# Patient Record
Sex: FEMALE | Race: WHITE | Employment: OTHER | URBAN - METROPOLITAN AREA
[De-identification: names, ages, dates, MRNs, and addresses within clinical notes are randomized per-mention and may not be internally consistent; named-entity substitution may affect disease eponyms.]

---

## 2017-09-10 ENCOUNTER — HOSPITAL ENCOUNTER (EMERGENCY)
Age: 82
Discharge: HOME | End: 2017-09-10
Attending: EMERGENCY MEDICINE | Admitting: EMERGENCY MEDICINE
Payer: MEDICARE

## 2017-09-10 ENCOUNTER — APPOINTMENT (OUTPATIENT)
Dept: CT IMAGING | Age: 82
End: 2017-09-10
Attending: PHYSICIAN ASSISTANT
Payer: MEDICARE

## 2017-09-10 ENCOUNTER — APPOINTMENT (OUTPATIENT)
Dept: CT IMAGING | Age: 82
End: 2017-09-10
Attending: EMERGENCY MEDICINE
Payer: MEDICARE

## 2017-09-10 ENCOUNTER — APPOINTMENT (OUTPATIENT)
Dept: GENERAL RADIOLOGY | Age: 82
End: 2017-09-10
Attending: EMERGENCY MEDICINE
Payer: MEDICARE

## 2017-09-10 VITALS
HEIGHT: 62 IN | BODY MASS INDEX: 23 KG/M2 | DIASTOLIC BLOOD PRESSURE: 62 MMHG | TEMPERATURE: 98.4 F | WEIGHT: 125 LBS | HEART RATE: 86 BPM | RESPIRATION RATE: 16 BRPM | SYSTOLIC BLOOD PRESSURE: 149 MMHG | OXYGEN SATURATION: 97 %
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
## 2017-09-10 DIAGNOSIS — S05.92XA: ICD-10-CM

## 2017-09-10 PROCEDURE — 90715 TDAP VACCINE 7 YRS/> IM: CPT | Performed by: PHYSICIAN ASSISTANT

## 2017-09-10 PROCEDURE — 99283 EMERGENCY DEPT VISIT LOW MDM: CPT

## 2017-09-10 PROCEDURE — 74011250636 HC RX REV CODE- 250/636: Performed by: PHYSICIAN ASSISTANT

## 2017-09-10 PROCEDURE — 70480 CT ORBIT/EAR/FOSSA W/O DYE: CPT

## 2017-09-10 PROCEDURE — 74011250637 HC RX REV CODE- 250/637: Performed by: PHYSICIAN ASSISTANT

## 2017-09-10 PROCEDURE — 90471 IMMUNIZATION ADMIN: CPT

## 2017-09-10 PROCEDURE — 70450 CT HEAD/BRAIN W/O DYE: CPT

## 2017-09-10 RX ORDER — ACETAMINOPHEN 325 MG/1
650 TABLET ORAL
Status: COMPLETED | OUTPATIENT
Start: 2017-09-10 | End: 2017-09-10

## 2017-09-10 RX ADMIN — ACETAMINOPHEN 650 MG: 325 TABLET, FILM COATED ORAL at 20:21

## 2017-09-10 RX ADMIN — TETANUS TOXOID, REDUCED DIPHTHERIA TOXOID AND ACELLULAR PERTUSSIS VACCINE, ADSORBED 0.5 ML: 5; 2.5; 8; 8; 2.5 SUSPENSION INTRAMUSCULAR at 20:22

## 2017-09-10 NOTE — ED PROVIDER NOTES
HPI Comments: 81 yo female with hx of thyroid dx, anemia, HTN, CRI, colon CA and anxiety here for evaluation after fall. States she was walking when her shoe skidded on the concrete causing her to fall striking left side of face. Staes pain around left obit; denies visual changes. No N/V. Denies HA, neck pain, CP, SOB, abd pain, flank pain, urinary symptoms. Denies having dizziness, CP, SOB prior to, during or since fall. No changes in mentation per family. +Smoker. Patient is a 80 y.o. female presenting with fall. The history is provided by the patient. Fall   Incident onset: this evening. The fall occurred while walking. She fell from a height of ground level. Point of impact: Left side of face. The pain is at a severity of 7/10. The pain is mild. She was ambulatory at the scene. Pertinent negatives include no visual change, no numbness, no abdominal pain, no nausea, no vomiting, no hematuria, no extremity weakness and no loss of consciousness. It is unknown when the patient last had a tetanus shot.         Past Medical History:   Diagnosis Date    Anemia     CAD (coronary artery disease)     Cancer ([facility] [address] 3/2012    colon    CRI (chronic renal insufficiency)     benign atrophic left kidney-renal stones    Gastrointestinal disorder     resection, colonoscopy    Hypercholesteremia     Hypertension     Hypothyroid     Other ill-defined conditions     chemotherapy via richy cath for rectal ca    Other ill-defined conditions     radiation therapy for rectal cancer-last treatment May 2012    Psychiatric disorder     anxiety    Thyroid disease        Past Surgical History:   Procedure Laterality Date    ABDOMEN SURGERY PROC UNLISTED  7/2012    low anterior resection with protective loop ileostomy    ABDOMEN SURGERY PROC UNLISTED  2/2013    fistulotomy transanal closure of posterior rectal fistula    HX GI  11/18/13    Kraske approach with coccygectomy and closure of rectal fistula    HX HEENT  2/21/14    lupe myringotomy    HX HYSTERECTOMY  age 40    vaginal    HX OTHER SURGICAL      stent in artery to kidney    HX OTHER SURGICAL      colonoscopy    HX OTHER SURGICAL      Colon resection with colostomy    HX OTHER SURGICAL  2/26/14    wound debridement    HX VASCULAR ACCESS      richy cath left upper chest-non functioning now         Family History:   Problem Relation Age of Onset    Heart Disease Sister     Heart Disease Brother      2 brothers    Breast Cancer Sister        Social History     Social History    Marital status:      Spouse name: N/A    Number of children: N/A    Years of education: N/A     Occupational History    Not on file. Social History Main Topics    Smoking status: Current Every Day Smoker     Packs/day: 0.25     Years: 60.00    Smokeless tobacco: Never Used      Comment: cigarettes    Alcohol use No      Comment: none since colon surgery- seldom prior to that    Drug use: No    Sexual activity: Not on file     Other Topics Concern    Not on file     Social History Narrative         ALLERGIES: Review of patient's allergies indicates no known allergies. Review of Systems   Constitutional: Negative. HENT: Positive for facial swelling. Negative for ear discharge. Eyes: Negative for photophobia, discharge and visual disturbance. Respiratory: Negative for apnea, cough, chest tightness and shortness of breath. Cardiovascular: Negative for chest pain, palpitations and leg swelling. Gastrointestinal: Negative for abdominal distention, abdominal pain, blood in stool, nausea and vomiting. Genitourinary: Negative for difficulty urinating, dysuria, flank pain, frequency and hematuria. Musculoskeletal: Negative for back pain, extremity weakness, gait problem, joint swelling and neck pain. Skin: Negative for color change and pallor. Neurological: Negative for dizziness, loss of consciousness, syncope, weakness and numbness. Psychiatric/Behavioral: Negative for behavioral problems and confusion. The patient is not nervous/anxious. Vitals:    09/10/17 1748   BP: 127/55   Pulse: 95   Resp: 16   Temp: 97.5 °F (36.4 °C)   SpO2: 99%   Weight: 56.7 kg (125 lb)   Height: 5' 2\" (1.575 m)            Physical Exam   Constitutional: She is oriented to person, place, and time. She appears well-developed and well-nourished. No distress. HENT:   Head: Normocephalic. Right Ear: External ear normal.   Left Ear: External ear normal.   Nose: Nose normal.   Mouth/Throat: Oropharynx is clear and moist.   Ecchymosis and tenderness around left orbit. No hemorrhage noted. Eyes: Conjunctivae and EOM are normal. Pupils are equal, round, and reactive to light. Right eye exhibits no discharge. Left eye exhibits no discharge. Neck: Normal range of motion. Neck supple. Cardiovascular: Normal rate, regular rhythm, normal heart sounds and intact distal pulses. Pulmonary/Chest: Effort normal and breath sounds normal.   Abdominal: Soft. Bowel sounds are normal. She exhibits no distension. There is no tenderness. There is no rebound and no guarding. Musculoskeletal: Normal range of motion. She exhibits no edema or tenderness. Cervical back: Normal.   Neurological: She is alert and oriented to person, place, and time. She has normal strength. She is not disoriented. No cranial nerve deficit or sensory deficit. Coordination normal.   Skin: Skin is warm and dry. No rash noted. Psychiatric: She has a normal mood and affect. Her behavior is normal. Judgment and thought content normal.   Nursing note and vitals reviewed.        MDM  Number of Diagnoses or Management Options  Fall, initial encounter:   Orbit injury, left:      Amount and/or Complexity of Data Reviewed  Tests in the radiology section of CPT®: ordered and reviewed  Discuss the patient with other providers: yes  Independent visualization of images, tracings, or specimens: yes      ED Course       Procedures    Patient has been reassessed. Talkative with family in room; no changes in mentation. Reviewed medications and radiographics with patient and family. Ready to discharge home. Discussed case with attending Physician. Agrees with care and will D/C with follow up.      8:44 PM  Patient's results have been reviewed with them. Patient and/or family have verbally conveyed their understanding and agreement of the patient's signs, symptoms, diagnosis, treatment and prognosis and additionally agree to follow up as recommended or return to the Emergency Room should their condition change prior to follow-up. Discharge instructions have also been provided to the patient with some educational information regarding their diagnosis as well a list of reasons why they would want to return to the ER prior to their follow-up appointment should their condition change.   ALVIN Currie

## 2017-09-10 NOTE — ED TRIAGE NOTES
Pt [place] ans [place] on [place]. Pt fell to groind hitting her head above her left eye and injuring her left knee. Pt was assisted up and [facility] to [facility].  Pt sent here for further eval.  Pt denies having LOC.

## 2017-09-11 NOTE — DISCHARGE INSTRUCTIONS
Preventing Falls: Care Instructions  Your Care Instructions  Getting around your home safely can be a challenge if you have injuries or health problems that make it easy for you to fall. Loose rugs and furniture in walkways are among the dangers for many older people who have problems walking or who have poor eyesight. People who have conditions such as arthritis, osteoporosis, or dementia also have to be careful not to fall. You can make your home safer with a few simple measures. Follow-up care is a key part of your treatment and safety. Be sure to make and go to all appointments, and call your doctor if you are having problems. It's also a good idea to know your test results and keep a list of the medicines you take. How can you care for yourself at home? Taking care of yourself  · You may get dizzy if you do not drink enough water. To prevent dehydration, drink plenty of fluids, enough so that your urine is light yellow or clear like water. Choose water and other caffeine-free clear liquids. If you have kidney, heart, or liver disease and have to limit fluids, talk with your doctor before you increase the amount of fluids you drink. · Exercise regularly to improve your strength, muscle tone, and balance. Walk if you can. Swimming may be a good choice if you cannot walk easily. · Have your vision and hearing checked each year or any time you notice a change. If you have trouble seeing and hearing, you might not be able to avoid objects and could lose your balance. · Know the side effects of the medicines you take. Ask your doctor or pharmacist whether the medicines you take can affect your balance. Sleeping pills or sedatives can affect your balance. · Limit the amount of alcohol you drink. Alcohol can impair your balance and other senses. · Ask your doctor whether calluses or corns on your feet need to be removed.  If you wear loose-fitting shoes because of calluses or corns, you can lose your balance and fall. · Talk to your doctor if you have numbness in your feet. Preventing falls at home  · Remove raised doorway thresholds, throw rugs, and clutter. Repair loose carpet or raised areas in the floor. · Move furniture and electrical cords to keep them out of walking paths. · Use nonskid floor wax, and wipe up spills right away, especially on ceramic tile floors. · If you use a walker or cane, put rubber tips on it. If you use crutches, clean the bottoms of them regularly with an abrasive pad, such as steel wool. · Keep your house well lit, especially [place], and outside walkways. Use night-lights in areas such as hallways and bathrooms. Add extra light switches or use remote switches (such as switches that go on or off when you clap your hands) to make it easier to turn lights on if you have to get up during the night. · Install sturdy handrails on stairways. · Move items in your cabinets so that the things you use a lot are on the lower shelves (about waist level). · Keep a cordless phone and a flashlight with new batteries by your bed. If possible, put a phone in each of the main rooms of your house, or carry a cell phone in case you fall and cannot reach a phone. Or, you can wear a device around your neck or wrist. You push a button that sends a signal for help. · Wear low-heeled shoes that fit well and give your feet good support. Use footwear with nonskid soles. Check the heels and soles of your shoes for wear. Repair or replace worn heels or soles. · Do not wear socks without shoes on wood floors. · Walk on the grass when the sidewalks are slippery. If you live in an area that gets snow and ice in the winter, sprinkle salt on slippery steps and sidewalks. Preventing falls in the bath  · Install grab bars and nonskid mats inside and outside your shower or tub and near the toilet and sinks. · Use shower chairs and bath benches.   · Use a hand-held shower head that will allow you to sit while showering. · Get into a tub or shower by putting the weaker leg in first. Get out of a tub or shower with your strong side first.  · Repair loose toilet seats and consider installing a raised toilet seat to make getting on and off the toilet easier. · Keep your bathroom door unlocked while you are in the shower. Where can you learn more? Go to http://lourdes-earl.info/. Enter [phone] [id number] [phone] in the search box to learn more about \"Preventing Falls: Care Instructions. \"  Current as of: August 4, 2016  Content Version: 11.3  © 6566-8732 [employer]. Care instructions adapted under license by [employer] (which disclaims liability or warranty for this information). If you have questions about a medical condition or this instruction, always ask your healthcare professional. [place] [address] any warranty or liability for your use of this information. We hope that we have addressed all of your medical concerns. The examination and treatment you received in the Emergency Department were for an emergent problem and were not intended as complete care. It is important that you follow up with your healthcare provider(s) for ongoing care. If your symptoms worsen or do not improve as expected, and you are unable to reach your usual health care provider(s), you should return to the Emergency Department. Today's healthcare is undergoing tremendous change, and patient satisfaction surveys are one of the many tools to assess the quality of medical care. You may receive a survey from the [employer] organization regarding your experience in the Emergency Department. I hope that your experience has been completely positive, particularly the medical care that I provided. As such, please participate in the survey; anything less than excellent does not meet my expectations or intentions.         [facility] and [employer] participate in nationally recognized quality of care measures. If your blood pressure is greater than 120/80, as reported below, we urge that you seek medical care to address the potential of high blood pressure, commonly known as hypertension. Hypertension can be hereditary or can be caused by certain medical conditions, pain, stress, or \"white coat syndrome. \"       Please make an appointment with your health care provider(s) for follow up of your Emergency Department visit. VITALS:   Patient Vitals for the past 8 hrs:   Temp Pulse Resp BP SpO2   09/10/17 1748 97.5 °F (36.4 °C) 95 16 127/55 99 %          Thank you for allowing us to provide you with medical care today. We realize that you have many choices for your emergency care needs. Please choose us in the future for any continued health care needs. Angelica Fulton Rao, [address] [facility] [address]: [phone]            No results found for this or any previous visit (from the past 24 hour(s)). Ct Head Wo Cont    Result Date: 9/10/2017  EXAM:  CT HEAD WO CONT INDICATION:   fall Additional history: Ground level fall striking head, just above left eye. COMPARISON: None. . TECHNIQUE: Unenhanced CT of the head was performed using 5 mm images. Coronal and sagittal reformats were produced. Brain and bone windows were generated. CT dose reduction was achieved through use of a standardized protocol tailored for this examination and automatic exposure control for dose modulation. Gilberto Galloway FINDINGS: Age appropriate atrophy. There is no significant white matter disease. There is no intracranial hemorrhage, extra-axial collection, mass, mass effect or midline shift. The basilar cisterns are open. No acute infarct is identified. Intracranial atherosclerosis The bone windows demonstrate scaphocephaly. The visualized portions of the paranasal sinuses and mastoid air cells are clear.  Soft tissue swelling adjacent to the left orbit . IMPRESSION: 1. No evidence of acute intracranial abnormality by this modality. . 2. Left periorbital edema/contusion. Ct Orbit Ear Fossa Wo Cont    Result Date: 9/10/2017  EXAM:  CT ORBIT EAR FOSSA WO CONT INDICATION:   Trauma, status post ground level fall with trauma above the left eye COMPARISON:  None. CONTRAST:  None. TECHNIQUE:  Multislice helical CT of the orbits was performed in the axial plane without intravenous contrast administration. Coronal and sagittal reformations were generated. CT dose reduction was achieved through use of a standardized protocol tailored for this examination and automatic exposure control for dose modulation. FINDINGS: There is left orbital preseptal soft tissue swelling. There is no post septal hematoma. The orbital structures are intact. The globes, extra ocular muscles, and optic nerve sheath complexes demonstrate no significant abnormalities. . There is no fracture or other osseous abnormality of the orbits. There is mild mucosal thickening in the sphenoid air cells. The paranasal sinuses are otherwise clear. No air-fluid levels are demonstrated. . No abnormalities are identified within the visualized portions of the brain or nasopharynx. Calcification is noted in the region of the cavernous carotid arteries     IMPRESSION: Left orbital preseptal soft tissue swelling. No fractures or intraorbital injury demonstrated. Lucy Peaks

## 2017-09-11 NOTE — ED NOTES
Ice pack provided for knee and head. Pt resting on stretcher with family at the bedside. Call bell within reach. Will continue to monitor.

## 2017-09-11 NOTE — ED NOTES
Discharge instructions given to pt by ALVIN Verma. All questions answered and pt verbalized understanding. V/S stable @ time of discharge. No lines or drains in place. Pt ambulatory out of unit.

## 2017-09-11 NOTE — CALL BACK NOTE
[facility] Emergency Department Follow Up Call Record    Discharged to : Home/Family Home/Home Health/Skilled Facility/Rehab/Assisted Living/Other___home____  1) Did you receive your discharge instructions? Yes        2) Do you understand them? Yes         3) Are you able to follow them? Yes          If NO, what can I clarify for you? 4) Do you understand your diagnosis? Yes         5) Do you know which symptoms should prompt you to call the doctor? Yes     6) Were you able to fill and  any medications that were prescribed? Not applicable     7) You were prescribed ___n/a  8) ________for ____________________. Common side effects of this medication are____________________. This is not a complete list so please review the forms given from the pharmacy for a complete list.      9) Are there any questions about your medications? Not applicable            Have you scheduled any recommended doctors appointments (specialty, PCP) NO  Not at this time  If NO, what barriers are you encountering (transportation/lost contact info/cost/  didnt think necessary/no PCP  10) If discharged with Home Health, has the agency contacted you to schedule visit? 11) Is there anyone available to help you at home (meals, errands, transportation    monitoring) (adult children, neighbors, private duty companions) Yes    12) Are you on a special diet? No         If YES, do you understand the requirements for this diet? Education provided? 15) If presented with cough, bronchitis, COPD, asthma, is it ok to ask that the   respiratory disease management educator call you? Not applicable      14)  A) If presented with fall, were you issued an assistive device in the ED    Are you using? No  B) If given RX for device, have you obtained? Not applicable       If NO, barriers? C) Therapist recommended:   Are you able to implement the suggestions? Not applicable        If NO, barriers to implementation?      D) Are you having any difficulties with mobility inside your home?     (steps, bed, tub)No   If YES, ask if the [facility] PT can contact patient and good time and number?  15)  At the end of your discharge instructions, there is information about accessing [facility] SERVICES, have you had a chance to review those? Yes         Do you have any questions about signing up for this service? We encourage our patients to be active participants in their healthcare and this site is one of the ways to do that. It will allow you to access parts of your medical record, email your doctors office, schedule appointments, and request medications refills . 16) Are there any other questions that I can answer for you regarding    your Emergency department visit?  NO             Estimated Call Time:___4:22 PM  ________________ Date/Time:_______________

## 2018-07-26 ENCOUNTER — HOSPITAL ENCOUNTER (OUTPATIENT)
Dept: GENERAL RADIOLOGY | Age: 83
Discharge: HOME | End: 2018-07-26

## 2018-07-26 DIAGNOSIS — M53.3 SACRAL PAIN: ICD-10-CM

## 2018-07-26 PROCEDURE — 72220 X-RAY EXAM SACRUM TAILBONE: CPT

## 2018-11-26 ENCOUNTER — HOSPITAL ENCOUNTER (OUTPATIENT)
Dept: GENERAL RADIOLOGY | Age: 83
Discharge: HOME | End: 2018-11-26
Payer: MEDICARE

## 2018-11-26 DIAGNOSIS — K40.90 RIGHT INGUINAL HERNIA: ICD-10-CM

## 2018-11-26 PROCEDURE — 73502 X-RAY EXAM HIP UNI 2-3 VIEWS: CPT

## 2018-11-30 ENCOUNTER — HOSPITAL ENCOUNTER (OUTPATIENT)
Dept: CT IMAGING | Age: 83
Discharge: HOME | End: 2018-11-30
Payer: MEDICARE

## 2018-11-30 DIAGNOSIS — Z85.040: ICD-10-CM

## 2018-11-30 DIAGNOSIS — R10.31 RT GROIN PAIN: ICD-10-CM

## 2018-11-30 PROCEDURE — 72192 CT PELVIS W/O DYE: CPT

## 2018-11-30 RX ORDER — BARIUM SULFATE 20 MG/ML
900 SUSPENSION ORAL
Status: DISPENSED | OUTPATIENT
Start: 2018-11-30 | End: 2018-11-30

## 2019-03-27 ENCOUNTER — HOSPITAL ENCOUNTER (OUTPATIENT)
Dept: GENERAL RADIOLOGY | Age: 84
Discharge: HOME | End: 2019-03-27
Payer: MEDICARE

## 2019-03-27 DIAGNOSIS — Z99.2 DEPENDENCE ON RENAL DIALYSIS (HCC): ICD-10-CM

## 2019-03-27 DIAGNOSIS — N18.4 CHRONIC KIDNEY DISEASE, STAGE IV (SEVERE) (HCC): ICD-10-CM

## 2019-03-27 DIAGNOSIS — R06.02 SHORTNESS OF BREATH: ICD-10-CM

## 2019-03-27 PROCEDURE — 71046 X-RAY EXAM CHEST 2 VIEWS: CPT

## 2019-06-17 ENCOUNTER — HOSPITAL ENCOUNTER (EMERGENCY)
Age: 84
Discharge: LEFT BEFORE DISCHARGE | End: 2019-06-17
Attending: EMERGENCY MEDICINE
Payer: MEDICARE

## 2019-06-17 ENCOUNTER — APPOINTMENT (OUTPATIENT)
Dept: GENERAL RADIOLOGY | Age: 84
End: 2019-06-17
Attending: EMERGENCY MEDICINE
Payer: MEDICARE

## 2019-06-17 VITALS
DIASTOLIC BLOOD PRESSURE: 56 MMHG | BODY MASS INDEX: 20.24 KG/M2 | OXYGEN SATURATION: 98 % | HEIGHT: 62 IN | WEIGHT: 110 LBS | HEART RATE: 80 BPM | TEMPERATURE: 98.1 F | RESPIRATION RATE: 20 BRPM | SYSTOLIC BLOOD PRESSURE: 172 MMHG

## 2019-06-17 DIAGNOSIS — R06.02 SOB (SHORTNESS OF BREATH): Primary | ICD-10-CM

## 2019-06-17 DIAGNOSIS — R09.89 PULMONARY VASCULAR CONGESTION: ICD-10-CM

## 2019-06-17 LAB
ALBUMIN SERPL-MCNC: 3.6 G/DL (ref 3.5–5)
ALBUMIN/GLOB SERPL: 0.8 {RATIO} (ref 1.1–2.2)
ALP SERPL-CCNC: 96 U/L (ref 45–117)
ALT SERPL-CCNC: 14 U/L (ref 12–78)
ANION GAP SERPL CALC-SCNC: 9 MMOL/L (ref 5–15)
AST SERPL-CCNC: 15 U/L (ref 15–37)
ATRIAL RATE: 77 BPM
BASOPHILS # BLD: 0 K/UL (ref 0–0.1)
BASOPHILS NFR BLD: 1 % (ref 0–1)
BILIRUB SERPL-MCNC: 0.4 MG/DL (ref 0.2–1)
BNP SERPL-MCNC: 7229 PG/ML
BUN SERPL-MCNC: 36 MG/DL (ref 6–20)
BUN/CREAT SERPL: 7 (ref 12–20)
CALCIUM SERPL-MCNC: 8.9 MG/DL (ref 8.5–10.1)
CALCULATED P AXIS, ECG09: 100 DEGREES
CALCULATED R AXIS, ECG10: -24 DEGREES
CALCULATED T AXIS, ECG11: -16 DEGREES
CHLORIDE SERPL-SCNC: 102 MMOL/L (ref 97–108)
CO2 SERPL-SCNC: 27 MMOL/L (ref 21–32)
CREAT SERPL-MCNC: 5.5 MG/DL (ref 0.55–1.02)
DIAGNOSIS, 93000: NORMAL
DIFFERENTIAL METHOD BLD: ABNORMAL
EOSINOPHIL # BLD: 0.2 K/UL (ref 0–0.4)
EOSINOPHIL NFR BLD: 2 % (ref 0–7)
ERYTHROCYTE [DISTWIDTH] IN BLOOD BY AUTOMATED COUNT: 15.6 % (ref 11.5–14.5)
GLOBULIN SER CALC-MCNC: 4.3 G/DL (ref 2–4)
GLUCOSE SERPL-MCNC: 102 MG/DL (ref 65–100)
HCT VFR BLD AUTO: 37.5 % (ref 35–47)
HGB BLD-MCNC: 11.5 G/DL (ref 11.5–16)
IMM GRANULOCYTES # BLD AUTO: 0 K/UL (ref 0–0.04)
IMM GRANULOCYTES NFR BLD AUTO: 0 % (ref 0–0.5)
INR PPP: 1 (ref 0.9–1.1)
LYMPHOCYTES # BLD: 1.1 K/UL (ref 0.8–3.5)
LYMPHOCYTES NFR BLD: 14 % (ref 12–49)
MAGNESIUM SERPL-MCNC: 2.2 MG/DL (ref 1.6–2.4)
MCH RBC QN AUTO: 31.8 PG (ref 26–34)
MCHC RBC AUTO-ENTMCNC: 30.7 G/DL (ref 30–36.5)
MCV RBC AUTO: 103.6 FL (ref 80–99)
MONOCYTES # BLD: 1 K/UL (ref 0–1)
MONOCYTES NFR BLD: 12 % (ref 5–13)
NEUTS SEG # BLD: 5.9 K/UL (ref 1.8–8)
NEUTS SEG NFR BLD: 71 % (ref 32–75)
NRBC # BLD: 0 K/UL (ref 0–0.01)
NRBC BLD-RTO: 0 PER 100 WBC
P-R INTERVAL, ECG05: 260 MS
PHOSPHATE SERPL-MCNC: 5.5 MG/DL (ref 2.6–4.7)
PLATELET # BLD AUTO: 229 K/UL (ref 150–400)
PMV BLD AUTO: 9.6 FL (ref 8.9–12.9)
POTASSIUM SERPL-SCNC: 3.5 MMOL/L (ref 3.5–5.1)
PROT SERPL-MCNC: 7.9 G/DL (ref 6.4–8.2)
PROTHROMBIN TIME: 10.3 SEC (ref 9–11.1)
Q-T INTERVAL, ECG07: 466 MS
QRS DURATION, ECG06: 138 MS
QTC CALCULATION (BEZET), ECG08: 527 MS
RBC # BLD AUTO: 3.62 M/UL (ref 3.8–5.2)
SODIUM SERPL-SCNC: 138 MMOL/L (ref 136–145)
TROPONIN I SERPL-MCNC: 0.05 NG/ML
VENTRICULAR RATE, ECG03: 77 BPM
WBC # BLD AUTO: 8.3 K/UL (ref 3.6–11)

## 2019-06-17 PROCEDURE — 85025 COMPLETE CBC W/AUTO DIFF WBC: CPT

## 2019-06-17 PROCEDURE — 71046 X-RAY EXAM CHEST 2 VIEWS: CPT

## 2019-06-17 PROCEDURE — 83880 ASSAY OF NATRIURETIC PEPTIDE: CPT

## 2019-06-17 PROCEDURE — 99285 EMERGENCY DEPT VISIT HI MDM: CPT

## 2019-06-17 PROCEDURE — 84484 ASSAY OF TROPONIN QUANT: CPT

## 2019-06-17 PROCEDURE — 84100 ASSAY OF PHOSPHORUS: CPT

## 2019-06-17 PROCEDURE — 83735 ASSAY OF MAGNESIUM: CPT

## 2019-06-17 PROCEDURE — 93005 ELECTROCARDIOGRAM TRACING: CPT

## 2019-06-17 PROCEDURE — 36415 COLL VENOUS BLD VENIPUNCTURE: CPT

## 2019-06-17 PROCEDURE — 74011250636 HC RX REV CODE- 250/636: Performed by: EMERGENCY MEDICINE

## 2019-06-17 PROCEDURE — 80053 COMPREHEN METABOLIC PANEL: CPT

## 2019-06-17 PROCEDURE — 85610 PROTHROMBIN TIME: CPT

## 2019-06-17 RX ORDER — HYDRALAZINE HYDROCHLORIDE 20 MG/ML
10 INJECTION INTRAMUSCULAR; INTRAVENOUS
Status: DISCONTINUED | OUTPATIENT
Start: 2019-06-17 | End: 2019-06-17 | Stop reason: HOSPADM

## 2019-06-17 NOTE — ED TRIAGE NOTES
Arrived from home via EMS with c/o SOB since last night. A Tues. ,Thurs. Sat. Dialysis pt. Last dialysis Sat. Pt. [place] better after receiving O2 via EMS.

## 2019-06-17 NOTE — ED PROVIDER NOTES
80 y.o. female with past medical history significant for ESRD, Tu/Th/Sat HD patient, HTN, colon CA, s/p colostomy, CAD, and anxiety who presents from home via EMS with chief complaint of SOB. Patient reports starting last night with gradually worsening SOB. She states the SOB is worse when laying flat. Patient reports using her inhaler at home without relief. She is not on home O2. EMS reports that at the patient's dialysis session 4 days ago, she was told she had fluid in her lungs, but when she went 2 days ago they said her lungs were fine. EMS did not get RA sats, but placed the patient in O2 via NC and she was feeling better with O2 sats 100%. Patient was hypertensive en route, has not had her morning medications yet. Patient is a smoker. She has a colostomy bag, denies changes in output. Patient denies any fever, chest pain, cough, or leg pain. There are no other acute medical concerns at this time. Social hx: Current smoker (0.25 packs/day); No EtOH use  PCP: Momo Adkins MD    Note written by Kary Naidu, as dictated by Gisell Winters MD 7:10 AM     The history is provided by the patient, the EMS personnel and medical records. No  was used.         Past Medical History:   Diagnosis Date    Anemia     CAD (coronary artery disease)     Cancer ([facility] [address] 3/2012    colon    CRI (chronic renal insufficiency)     benign atrophic left kidney-renal stones    [facility]ialysis patient [facility])     Gastrointestinal disorder     resection, colonoscopy    Hypercholesteremia     Hypertension     Hypothyroid     Other ill-defined conditions(799.89)     chemotherapy via richy cath for rectal ca    Other ill-defined conditions(799.89)     radiation therapy for rectal cancer-last treatment May 2012    Psychiatric disorder     anxiety    Thyroid disease        Past Surgical History:   Procedure Laterality Date    ABDOMEN SURGERY PROC UNLISTED  7/2012    low anterior resection with protective loop ileostomy    ABDOMEN SURGERY PROC UNLISTED  2/2013    fistulotomy transanal closure of posterior rectal fistula    HX GI  11/18/13    Kraske approach with coccygectomy and closure of rectal fistula    HX HEENT  2/21/14    lupe myringotomy    HX HYSTERECTOMY  age 40    vaginal    HX OTHER SURGICAL      stent in artery to kidney    HX OTHER SURGICAL      colonoscopy    HX OTHER SURGICAL      Colon resection with colostomy    HX OTHER SURGICAL  2/26/14    wound debridement    HX VASCULAR ACCESS      richy cath left upper chest-non functioning now         Family History:   Problem Relation Age of Onset    Heart Disease Sister     Heart Disease Brother         2 brothers    Breast Cancer Sister        Social History     Socioeconomic History    Marital status:      Spouse name: Not on file    Number of children: Not on file    Years of education: Not on file    Highest education level: Not on file   Occupational History    Not on file   Social Needs    Financial resource strain: Not on file    Food insecurity:     Worry: Not on file     Inability: Not on file    Transportation needs:     Medical: Not on file     Non-medical: Not on file   Tobacco Use    Smoking status: Current Every Day Smoker     Packs/day: 0.25     Years: 60.00     Pack years: 15.00    Smokeless tobacco: Never Used    Tobacco comment: cigarettes   Substance and Sexual Activity    Alcohol use: No     Comment: none since colon surgery- seldom prior to that    Drug use: No    Sexual activity: Not on file   Lifestyle    Physical activity:     Days per week: Not on file     Minutes per session: Not on file    Stress: Not on file   Relationships    Social connections:     Talks on phone: Not on file     Gets together: Not on file     Attends [employer] service: Not on file     Active member of club or organization: Not on file     Attends meetings of clubs or organizations: Not on file     Relationship status: Not on file  Intimate partner violence:     Fear of current or ex partner: Not on file     Emotionally abused: Not on file     Physically abused: Not on file     Forced sexual activity: Not on file   Other Topics Concern    Not on file   Social History Narrative    Not on file         ALLERGIES: Patient has no known allergies. Review of Systems   Constitutional: Negative for fever. Respiratory: Positive for shortness of breath. Negative for cough. Cardiovascular: Negative for chest pain. Gastrointestinal: Negative for blood in stool, constipation and diarrhea. Musculoskeletal: Negative for arthralgias and myalgias. Vitals:    06/17/19 0714   BP: (!) 204/61   Pulse: 78   Resp: (!) 36   Temp: 97.6 °F (36.4 °C)   SpO2: 94%   Weight: 49.9 kg (110 lb)   Height: 5' 2\" (1.575 m)            Physical Exam   Constitutional: She is oriented to person, place, and time. She appears well-developed and well-nourished. No distress. HENT:   Head: Normocephalic. Mouth/Throat: Oropharynx is clear and moist. Mucous membranes are dry. Dry MM. Eyes: Pupils are equal, round, and reactive to light. Conjunctivae and EOM are normal.   Neck: Normal range of motion. Neck supple. No JVD present. Cardiovascular: Normal rate, regular rhythm, normal heart sounds and intact distal pulses. Pulmonary/Chest: Tachypnea noted. She has rales. Increased work of breathing. Tachypneic. Diffuse bilateral rales. Dialysis catheter to right chest wall. Abdominal: Soft. Bowel sounds are normal. She exhibits no distension. There is no tenderness. Colostomy bag LLQ. Musculoskeletal: Normal range of motion. She exhibits no edema, tenderness or deformity. Lymphadenopathy:     She has no cervical adenopathy. Neurological: She is alert and oriented to person, place, and time. No cranial nerve deficit or sensory deficit. Skin: Skin is warm and dry. Capillary refill takes less than 2 seconds. No rash noted. She is not diaphoretic. No erythema. Psychiatric: She has a normal mood and affect. Nursing note and vitals reviewed. Note written by Dottie Reilly, as dictated by Shey Gonzalez MD 7:10 AM     MDM       Procedures      ED EKG interpretation:  Rhythm: normal sinus rhythm; and regular . Rate (approx.): 77 bpm; RBBB; Possible LVH; Artifact present; Nonspecific ST abnormalities in the anterior leads. Note written by Beuford Gowers, Scribe, as dictated by Shey Gonzalez MD 7:36 AM      8:07 AM  Pt cannot recall her home meds. However, hypertensive thus will give IV hydralazine at this time. 8:38 AM  Discussed results and indications for admission with the patient. Patient is not sure she wants to stay but would like to receive dialysis or speak to nephrologist today. Consulting her nephrologist while the patient decides. 11:15 AM  Patient still does not want to be admitted. Have not heard back from nephrology, and the patient no longer wants to wait, states she will just get her normal treatment tomorrow. She wants to sign out AMA. 12:19 PM  Patient's son has spoken to the patient, who is stating \"someone from [address] [facility] has to see the patient before she leaves\". Discussed with the patient that we had been waiting for nephrology, including [facility] who is on call today, to come see her/call back, but she had wanted to leave. Once again, I offered the patient admission or to wait on nephrologist if she doesn't and she is refusing. She is signing out AMA.      Jerryl Siemens, MD

## 2019-06-17 NOTE — ED NOTES
8974:  Patient has fistula to left arm, shunt in right chest.    0747:  Bedside and Verbal shift change report given to Lewis Main RN (oncoming nurse) by Christina Osorio RN (offgoing nurse). Report included the following information SBAR, Kardex, ED Summary and Recent Results.